# Patient Record
Sex: FEMALE | Race: WHITE | NOT HISPANIC OR LATINO | Employment: UNEMPLOYED | URBAN - METROPOLITAN AREA
[De-identification: names, ages, dates, MRNs, and addresses within clinical notes are randomized per-mention and may not be internally consistent; named-entity substitution may affect disease eponyms.]

---

## 2021-08-13 ENCOUNTER — HOSPITAL ENCOUNTER (EMERGENCY)
Facility: HOSPITAL | Age: 56
Discharge: HOME | End: 2021-08-13
Attending: EMERGENCY MEDICINE
Payer: COMMERCIAL

## 2021-08-13 VITALS
HEART RATE: 66 BPM | SYSTOLIC BLOOD PRESSURE: 144 MMHG | RESPIRATION RATE: 18 BRPM | TEMPERATURE: 97.9 F | OXYGEN SATURATION: 96 % | DIASTOLIC BLOOD PRESSURE: 74 MMHG

## 2021-08-13 DIAGNOSIS — S01.511A LACERATION OF LIP: ICD-10-CM
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
## 2021-08-13 PROCEDURE — 90715 TDAP VACCINE 7 YRS/> IM: CPT

## 2021-08-13 PROCEDURE — 90471 IMMUNIZATION ADMIN: CPT

## 2021-08-13 PROCEDURE — 99283 EMERGENCY DEPT VISIT LOW MDM: CPT

## 2021-08-13 PROCEDURE — 99284 EMERGENCY DEPT VISIT MOD MDM: CPT | Performed by: EMERGENCY MEDICINE

## 2021-08-13 RX ORDER — AMOXICILLIN 500 MG/1
500 CAPSULE ORAL EVERY 12 HOURS SCHEDULED
Qty: 10 CAPSULE | Refills: 0 | Status: SHIPPED | OUTPATIENT
Start: 2021-08-13 | End: 2021-08-13 | Stop reason: SDUPTHER

## 2021-08-13 RX ORDER — AMOXICILLIN 500 MG/1
500 CAPSULE ORAL EVERY 12 HOURS SCHEDULED
Qty: 10 CAPSULE | Refills: 0 | Status: SHIPPED | OUTPATIENT
Start: 2021-08-13 | End: 2021-08-18

## 2021-08-13 RX ADMIN — TETANUS TOXOID, REDUCED DIPHTHERIA TOXOID AND ACELLULAR PERTUSSIS VACCINE, ADSORBED 0.5 ML: 5; 2.5; 8; 8; 2.5 SUSPENSION INTRAMUSCULAR at 18:48

## 2021-08-13 NOTE — ED PROVIDER NOTES
History  Chief Complaint   Patient presents with    Fall     pt presents ambulatory with cane with c/o mechanical fall this morning with injury to face  c/o pain in nose, loose teeth  laceration above lip bleeding controlled  neg LOC     56 yo F w/ no history of syncope or cardiac arrhythmia presents w/ pain in the maxilla and nose following a mechanical fall approximately 12 hours ago  Pt is visiting family from out of town and while ambulating to the bathroom in the dark she tripped on a small step and struck her face on the hardwood floor  She did not break her fall with her hands  Denies lightheadedness, dizzyness, or palpitations immediately preceding fall  Pt had pain and bleeding from the nose and mouth  Pt developed headache a few hours later which max severity of 5/10  Has been taking motrin throughout the day  Pt states headache and face pain have improved since incident  Headache currently 0/10, facial pain 3/10  Denies direct head strike, LOC, visual changes, neck pain, or injury to any other part of the body  Patient had to wait for her daughter to get off work before she could come to the ED  Denies taking blood thinners, ASA, or plavix  None       Past Medical History:   Diagnosis Date    Prediabetes        Past Surgical History:   Procedure Laterality Date    KNEE SURGERY Left 01/2021       History reviewed  No pertinent family history  I have reviewed and agree with the history as documented  E-Cigarette/Vaping     E-Cigarette/Vaping Substances     Social History     Tobacco Use    Smoking status: Never Smoker    Smokeless tobacco: Never Used   Substance Use Topics    Alcohol use: Not Currently    Drug use: Not Currently        Review of Systems   All other systems reviewed and are negative        Physical Exam  ED Triage Vitals   Temperature Pulse Respirations Blood Pressure SpO2   08/13/21 1744 08/13/21 1744 08/13/21 1744 08/13/21 1744 08/13/21 1744   97 9 °F (36 6 °C) 74 18 142/69 95 %      Temp Source Heart Rate Source Patient Position - Orthostatic VS BP Location FiO2 (%)   08/13/21 1744 08/13/21 1744 08/13/21 1800 08/13/21 1800 --   Oral Monitor Lying Right arm       Pain Score       08/13/21 1744       4             Orthostatic Vital Signs  Vitals:    08/13/21 1744 08/13/21 1800   BP: 142/69 144/74   Pulse: 74 66   Patient Position - Orthostatic VS:  Lying       Physical Exam  Vitals and nursing note reviewed  Constitutional:       General: She is not in acute distress  Appearance: Normal appearance  She is not ill-appearing, toxic-appearing or diaphoretic  HENT:      Head: Normocephalic  Laceration present  No raccoon eyes, Knowles's sign, contusion, masses, right periorbital erythema or left periorbital erythema  Jaw: There is normal jaw occlusion  No trismus, tenderness, swelling, pain on movement or malocclusion  Right Ear: Tympanic membrane, ear canal and external ear normal       Left Ear: Tympanic membrane, ear canal and external ear normal       Nose: Nose normal  No congestion or rhinorrhea  Comments: Tenderness over bridge of nose     Mouth/Throat:      Lips: Pink  No lesions  Mouth: Mucous membranes are moist       Tongue: No lesions  Palate: No lesions  Pharynx: Oropharynx is clear  No oropharyngeal exudate or posterior oropharyngeal erythema  Comments: No facial bone or skull tenderness  1 cm linear, horizontal, well-approximated, healing laceration midline upper lip  + edema upper lip  1 cm, mildly gaping linear laceration inside of upper lip  Lacerations do not go through and through  Teeth 8 and 9 tender, mildly moveable with pressure  Eyes:      General:         Right eye: No discharge  Left eye: No discharge  Extraocular Movements: Extraocular movements intact  Conjunctiva/sclera: Conjunctivae normal       Pupils: Pupils are equal, round, and reactive to light     Cardiovascular:      Rate and Rhythm: Normal rate and regular rhythm  Pulses: Normal pulses  Heart sounds: Normal heart sounds  No murmur heard  No friction rub  Pulmonary:      Effort: Pulmonary effort is normal  No respiratory distress  Breath sounds: Normal breath sounds  No wheezing or rales  Abdominal:      General: Abdomen is flat  Bowel sounds are normal  There is no distension  Palpations: Abdomen is soft  Tenderness: There is no abdominal tenderness  There is no guarding  Musculoskeletal:         General: Swelling, tenderness and signs of injury present  No deformity  Normal range of motion  Cervical back: Normal range of motion and neck supple  No rigidity or tenderness  Comments: Mild paraspinal tenderness at C-7  No bony tenderness, no step-offs  No T or L spine tenderness  Remainder of MSK exam negative for tenderness or deformities  See HEENT for positive MSK findings on face  Lymphadenopathy:      Cervical: No cervical adenopathy  Skin:     General: Skin is warm and dry  Capillary Refill: Capillary refill takes less than 2 seconds  Coloration: Skin is not pale  Neurological:      General: No focal deficit present  Mental Status: She is alert and oriented to person, place, and time  Cranial Nerves: No cranial nerve deficit  Sensory: No sensory deficit  Motor: No weakness     Psychiatric:         Mood and Affect: Mood normal          ED Medications  Medications   tetanus-diphtheria-acellular pertussis (BOOSTRIX) IM injection 0 5 mL (0 5 mL Intramuscular Given 8/13/21 7250)       Diagnostic Studies  Results Reviewed     None                 No orders to display         Procedures  Procedures      ED Course                                       MDM  Number of Diagnoses or Management Options  Fall, initial encounter: new and does not require workup  Laceration of lip: new and does not require workup  Diagnosis management comments: Impression: Contusion to nose, upper lip, teeth 8, 9 following fall from standing  No indications for imaging  Pain has mostly resolved since incident  No skull and facial bone tenderness  No intranasal hematoma  No active bleeding  Laceration on upper lip well-approximated and healing  Tdap updated  5 days amoxicillin provided to prevent seeding of recently-implanted prosthetic knee joint at request of patient's orthopedic surgeon  Risk of Complications, Morbidity, and/or Mortality  Presenting problems: low  Diagnostic procedures: minimal  Management options: minimal    Patient Progress  Patient progress: stable      Disposition  Final diagnoses:   Fall, initial encounter   Laceration of lip     Time reflects when diagnosis was documented in both MDM as applicable and the Disposition within this note     Time User Action Codes Description Comment    8/13/2021  6:33 PM Romulo Samples Add [M66  WUII] Fall, initial encounter     8/13/2021  6:34 PM Natrona Samples Add [K40 508Q] Laceration of lip       ED Disposition     ED Disposition Condition Date/Time Comment    Discharge Stable Fri Aug 13, 2021  6:33 PM [facility] discharge to home/self care  Follow-up Information    None         There are no discharge medications for this patient  No discharge procedures on file  PDMP Review     None           ED Provider  Attending physically available and evaluated [facility] managed the patient along with the ED Attending      Electronically Signed by         Lamar Sorenson MD  08/13/21 4867

## 2021-08-13 NOTE — DISCHARGE INSTRUCTIONS
Return to emergency department if you experience increasing headache that is not the side with OTC pain medication, confusion, or any numbness or weakness anywhere in the body  Follow-up with dentist   Avoid hot or spicy foods  Wash mouth twice per day with Listerine to encourage healing of the inner lip laceration

## 2021-08-13 NOTE — ED ATTENDING ATTESTATION
8/13/2021  Quin MOREIRA, DO, saw and evaluated the patient  I have discussed the patient with the resident/non-physician practitioner and agree with the resident's/non-physician practitioner's findings, Plan of Care, and MDM as documented in the resident's/non-physician practitioner's note, except where noted  All available labs and Radiology studies were reviewed  I was present for key portions of any procedure(s) performed by the resident/non-physician practitioner and I was immediately available to provide assistance  At this point I agree with the current assessment done in the Emergency Department  I have conducted an independent evaluation of this patient a history and physical is as follows:    Patient is a 52-year-old female, earlier this morning she was walking about 6 am when she tripped, landing forward on her face  Did not pass out  Had no blurred vision or double vision, did noticed that her upper central incisors feel a little bit loose  Also noticed that she had a small cut on the inner side of her upper lip as well as the outside of her upper lip  She has no headache now  She did have some mild headache afterwards, took ibuprofen this afternoon, feels okay  No difficulty speaking, thinking, or concentrating  No pain in the arms or legs  Does wear ankle braces because of ankle instability which is chronic for her  She does have some mild chronic low back pain which is also unchanged  She presents to ED today could she was unsure if her facial laceration or intraoral laceration required suture  General:  Patient is well-appearing  Head:  Atraumatic  Eyes:  Conjunctiva pink, Extraocular muscle intact, no periorbital ecchymosis, PERRL  ENT:  Mucous membranes are moist, no dental malocclusion, no craniofacial instability, no Knowles signs, no hemotympanum on either side  Negative tongue blade test bilaterally  She does have very slight instability of teeth 8  And 9   They are not displaced  On her inner upper lip is a 1 cm long by 2 mm deep superficial laceration on the inside mucosa  Does not cross the vermilion border  Is not through and through  On the outer part of her upper lip, just below the nose is a 1 cm long laceration, less than 1 mm deep, already healing, slight scab formation  It is not through and through  She has no nasal tenderness, no septal hematoma or deformity  Good air movement  Neck:  Supple, no tenderness or step-offs or deformities  Cardiac:  S1-S2, without murmurs, no chest wall tenderness  Lungs:  Clear to auscultation bilaterally  Abdomen:  Soft, nontender, normal bowel sounds, no CVA tenderness, no tympany, no rigidity, no guarding  Extremities:  No bony tenderness to the bilateral bilateral humeral heads, humerus, elbows, radius, ulna, hands, hips, femurs, knees, tibia, fibula, feet  No pain with passive range of motion at the bilateral shoulders, elbows, wrists, hips, knees, or ankles  Back: No midline cervical, thoracic, lumbar, sacral tenderness, deformities, or step-offs  Neurologic:  Awake, fluent speech, normal comprehension, AAOx3, No deficit on finger to nose testing, no pronator drift, cranial nerves II through XII are intact, no facial droop, no slurred speech, normal sensation, strength 5/5 in b/l upper & lower extremities  Skin:  Pink warm and dry  Psychiatric:  Alert, pleasant, cooperative      ED Course     Do not believe the patient requires intracranial imaging, no visible deformity to the face, do not believe that maxillofacial CT is necessary at this point  No signs of entrapment she has no diplopia through complete extraocular movement  Recommend Dentistry follow-up for the slightly loose teeth 8  And 9  Supportive care, importance of follow-up and return precautions were discussed with the patient, who expressed understanding         Critical Care Time  Procedures